# Patient Record
Sex: MALE | Race: WHITE | URBAN - METROPOLITAN AREA
[De-identification: names, ages, dates, MRNs, and addresses within clinical notes are randomized per-mention and may not be internally consistent; named-entity substitution may affect disease eponyms.]

---

## 2022-04-08 ENCOUNTER — HOSPITAL ENCOUNTER (OUTPATIENT)
Dept: NUTRITION | Age: 42
Discharge: HOME | End: 2022-04-08
Payer: SELF-PAY

## 2022-04-08 VITALS — HEIGHT: 69 IN

## 2022-04-08 PROCEDURE — 97802 MEDICAL NUTRITION INDIV IN: CPT

## 2022-04-08 NOTE — PROGRESS NOTES
Janet Chatterjee RD, LD  Outpatient Registered Dietitian  [facility]  Phone: [phone]  Fax: [phone]    ASSESSMENT  Pt is a 37 y.o. male referred with the following diagnosis (es): Gastro-esophageal reflux disease without esophagitis [K21.9]     Reason for referral: discuss weight loss  Patient stated goal: weight loss    Has eliminated fast food because he realized it was exacerbating his reflux. C/o reflux with difficulty attributing it to any specific foods, and 2 bowel movements/day, 4 days per week. Started taking benefiber in the morning. Fatty liver noted. Was told to continue probiotic and fiber supplement and takes levsin for cramps. Works as a  9hrs/day, may eat to stay alert. Also opting for bland foods because those tend to not exacerbate reflux    Initial Diet Recall (written):   Eating 2 meals a day with several snacks. Consumes 0-3 alcoholic beverages/day, 36RK water/day. Will utilize caffeine while driving. His girlfriend, Lizabeth Nice, cooks dinner most nights which often because lunch the next day. Keeps snacks like nutrigrain bars and cheezits available in the truck. Opts for convenience options when on the road. Knows that tomatoes exacerbate reflux. Food choices include cheese its, nutrigrain bars, air fried pork chop, buffalo cauliflower, crab ragoons, pringles, ravioli, garlic toast, watermelon, shrimp and grits, clarisa cheese steak. One day diet recall ~1800-2000kcal, 77g total fat, 92g protein, 12g fiber. Meals appear inadequate in protein, fruits, vegetables, poly and monounsaturated fats and fiber, and high in total calories, total fat, saturated fat, sodium, and refined carbohydrate.     Labs: none recent, high cholesterol in 2020  Meds: benefiber, probiotic, levsin    Barriers to change: nutrition knowledge deficit, established food preferences/eating behaviors and lacks applicable skills- cooking, meal planning, time management, etc.  Lifestyle/Family Influence/Support: lives with girlfriend, Severo Patricia, who cooks dinner- she has started incorporating more fruits and vegetables. Physical Activity: none  Sleep Habits: unknown  Stage of Change: Preparation. Anthropometrics:   Ht: 69in   Wt: 205#     Estimated Nutrition Needs:  MSJ= 1830.25 kcal/d    EEN for weight loss = MSJ x 1.2 - 500kcal/day     (1696kcal/d)      Protein: 85g/day (20%)     DIAGNOSIS:  Unbalanced diet related to nutrition related knowledge deficit as evidenced by diet recall. .    INTERVENTION:  Goal: Weight loss of 1-2#/wk  -Aim for minimum 20g protein per meal  -Increase fiber intake to 5g/meal from baseline  -Opt for lean protein to reduce total fat intake  -Track meals     Nutrition Education and Counseling (60minutes):   Reviewed diet recall and nutrition goals. Reviewed GERD nutrition recommendations. Discussed how high refined carbohydrate intake is likely contributing to lack of fullness, desire to snack, and weight gain. Discussed nutrient density/caloric density concepts, reviewed Curazy Eating Plate and macronutrient balance at meals, and discussed strategies to increase fiber, decrease total calorie intake. Voiced he would like to focus on reducing total fat as he realized his protein choices were very high in fat. Discussed alcohol recommendations and how this contributes to calorie intake. Encouraged increasing exercise and focusing on sustainable changes with food choices. We discussed planning ahead and simple steps to begin to incorporate additional fruit/vegetable servings and balance meals and snacks. At his request we reviewed his probiotic- discussed wheat dextrin in benefiber, high fat meals, caffeine, and gaviscon may be contributing to bowel habits. Willing to track meals via myfitnesspal and symptoms for review at follow up.      Materials Provided and Discussed:  Curazy Eating Plate with basic meal planning    Utilized the following behavior change strategies: motivational interviewing, goal setting, self monitoring and cognitive restructuring  Pt verbalized understanding of recommendations discussed. Anticipate fair compliance with recommendations. MONITORING & EVALUATION:  Monitor food/nutrient intake and weight  Follow Up: Not set. Patient to call and set follow up with our office once new work schedule is established.